# Patient Record
Sex: MALE | Race: WHITE | NOT HISPANIC OR LATINO | Employment: OTHER | URBAN - METROPOLITAN AREA
[De-identification: names, ages, dates, MRNs, and addresses within clinical notes are randomized per-mention and may not be internally consistent; named-entity substitution may affect disease eponyms.]

---

## 2017-10-03 RX ORDER — VENLAFAXINE HYDROCHLORIDE 37.5 MG/1
37.5 CAPSULE, EXTENDED RELEASE ORAL DAILY
COMMUNITY
End: 2017-10-09 | Stop reason: ALTCHOICE

## 2017-10-03 RX ORDER — LOSARTAN POTASSIUM AND HYDROCHLOROTHIAZIDE 25; 100 MG/1; MG/1
1 TABLET ORAL DAILY
COMMUNITY
End: 2020-03-10

## 2017-10-09 ENCOUNTER — TELEPHONE (OUTPATIENT)
Dept: CARDIOLOGY | Facility: CLINIC | Age: 67
End: 2017-10-09

## 2017-10-09 ENCOUNTER — OFFICE VISIT (OUTPATIENT)
Dept: CARDIOLOGY | Facility: CLINIC | Age: 67
End: 2017-10-09

## 2017-10-09 VITALS
DIASTOLIC BLOOD PRESSURE: 102 MMHG | HEART RATE: 65 BPM | HEIGHT: 69 IN | SYSTOLIC BLOOD PRESSURE: 174 MMHG | BODY MASS INDEX: 36.58 KG/M2 | WEIGHT: 247 LBS

## 2017-10-09 DIAGNOSIS — G47.33 OSA (OBSTRUCTIVE SLEEP APNEA): ICD-10-CM

## 2017-10-09 DIAGNOSIS — R60.0 LOWER EXTREMITY EDEMA: ICD-10-CM

## 2017-10-09 DIAGNOSIS — I10 ESSENTIAL HYPERTENSION: Primary | ICD-10-CM

## 2017-10-09 PROCEDURE — 99204 OFFICE O/P NEW MOD 45 MIN: CPT | Performed by: INTERNAL MEDICINE

## 2017-10-09 PROCEDURE — 93000 ELECTROCARDIOGRAM COMPLETE: CPT | Performed by: INTERNAL MEDICINE

## 2017-10-09 RX ORDER — POTASSIUM CHLORIDE 20 MEQ/1
1 TABLET, EXTENDED RELEASE ORAL 3 TIMES DAILY
COMMUNITY
Start: 2017-09-11 | End: 2020-03-10

## 2017-10-09 RX ORDER — VENLAFAXINE HYDROCHLORIDE 75 MG/1
1 CAPSULE, EXTENDED RELEASE ORAL DAILY
COMMUNITY
Start: 2017-10-06

## 2017-10-09 RX ORDER — FUROSEMIDE 20 MG/1
1 TABLET ORAL DAILY
COMMUNITY
Start: 2017-09-28 | End: 2020-03-10

## 2017-10-09 RX ORDER — CARVEDILOL 3.12 MG/1
3.12 TABLET ORAL 2 TIMES DAILY
Qty: 60 TABLET | Refills: 11 | Status: SHIPPED | OUTPATIENT
Start: 2017-10-09 | End: 2018-11-01 | Stop reason: SDUPTHER

## 2017-10-09 RX ORDER — ATORVASTATIN CALCIUM 10 MG/1
1 TABLET, FILM COATED ORAL DAILY
COMMUNITY
Start: 2017-09-06

## 2017-10-09 NOTE — PROGRESS NOTES
Mat Delvalle  1950  Date of Office Visit: 10/09/2017  Encounter Provider: Randal Solomon MD  Place of Service: [facility] CARDIOLOGY      CHIEF COMPLAINT:  Shortness of air  Lower extremity edema  Essential hypertension    HISTORY OF PRESENT ILLNESS:Dr. Blake:    I had the pleasure of seeing your patient in consultation today.  As you well know, he is a very pleasant, 67-year-old gentleman with a medical history of essential hypertension, bipolar disorder, alcohol abuse, lower extremity edema, and obstructive sleep apnea on CPAP who presents to me for evaluation of his elevated blood pressure along with lower extremity edema.    He, from a cardiac standpoint, has had a coronary angiography in 2008.          Review of Systems   Constitution: Negative for fever, weakness and malaise/fatigue.   HENT: Negative for nosebleeds and sore throat.    Eyes: Negative for blurred vision and double vision.   Cardiovascular: Negative for chest pain, claudication, palpitations and syncope.   Respiratory: Negative for cough, shortness of breath and snoring.    Endocrine: Negative for cold intolerance, heat intolerance and polydipsia.   Skin: Negative for itching, poor wound healing and rash.   Musculoskeletal: Negative for joint pain, joint swelling, muscle weakness and myalgias.   Gastrointestinal: Negative for abdominal pain, melena, nausea and vomiting.   Neurological: Negative for light-headedness, loss of balance, seizures and vertigo.   Psychiatric/Behavioral: Negative for altered mental status and depression.       Past Medical History:   Diagnosis Date   • Allergic rhinitis    • BPH (benign prostatic hyperplasia)    • Depression    • Hyperglycemia    • Hyperlipidemia    • Hypertension    • GIOVANI (obstructive sleep apnea)        The following portions of the patient's history were reviewed and updated as appropriate: Social history , Family history and Surgical history     Current  "Outpatient Prescriptions on File Prior to Visit   Medication Sig Dispense Refill   • AMLODIPINE BESYLATE PO Take 5 mg by mouth Daily.     • losartan-hydrochlorothiazide (HYZAAR) 100-25 MG per tablet Take 1 tablet by mouth Daily.     • venlafaxine XR (EFFEXOR XR) 37.5 MG 24 hr capsule Take 37.5 mg by mouth Daily.       No current facility-administered medications on file prior to visit.        Allergies   Allergen Reactions   • Ace Inhibitors Cough   • Penicillins        Vitals:    10/09/17 1245   BP: (!) 174/102   Pulse: 65   Weight: 247 lb (112 kg)   Height: 69\" (175.3 cm)     Physical Exam   Constitutional: He is oriented to person, place, and time. He appears well-developed and well-nourished.   Obese   HENT:   Head: Normocephalic and atraumatic.   Eyes: Conjunctivae and EOM are normal. No scleral icterus.   Neck: Normal range of motion. Neck supple. Normal carotid pulses, no hepatojugular reflux and no JVD present. Carotid bruit is not present. No tracheal deviation present. No thyromegaly present.   Cardiovascular: Normal rate and regular rhythm.  Exam reveals no gallop and no friction rub.    No murmur heard.  Pulses:       Carotid pulses are 2+ on the right side, and 2+ on the left side.       Radial pulses are 2+ on the right side, and 2+ on the left side.        Femoral pulses are 2+ on the right side, and 2+ on the left side.       Dorsalis pedis pulses are 2+ on the right side, and 2+ on the left side.        Posterior tibial pulses are 2+ on the right side, and 2+ on the left side.   Pulmonary/Chest: Breath sounds normal. No respiratory distress. He has no decreased breath sounds. He has no wheezes. He has no rhonchi. He has no rales. He exhibits no tenderness.   Abdominal: Soft. Bowel sounds are normal. He exhibits no distension. There is no tenderness. There is no rebound.   Musculoskeletal: He exhibits no edema or deformity.   Neurological: He is alert and oriented to person, place, and time. He has " normal strength. No sensory deficit.   Skin: No rash noted. No erythema.   Psychiatric: He has a normal mood and affect. His behavior is normal.           No components found for: CBC  No results found for: CMP  No components found for: LIPID  No results found for: BMP      ECG 12 Lead  Date/Time: 10/9/2017 1:08 PM  Performed by: RYLIE MCKEON  Authorized by: RYLIE MCKEON   Comparison: not compared with previous ECG   Previous ECG: no previous ECG available  Rhythm: sinus rhythm  Rate: normal  Conduction: conduction normal  ST Segments: ST segments normal  T Waves: T waves normal  QRS axis: normal  Clinical impression: normal ECG            DISCUSSION/SUMMARYThe patient is a 67-year-old gentleman with a medical history as documented above including essential hypertension, obesity, alcohol abuse, sleep apnea, lower extremity edema, and bipolar disorder who presents to me to establish care.  He has bilateral lower extremity edema reported in his history; however, he has none present today clinically.  He states that, after coming off the amlodipine therapy, he has noted improvement.  In addition, he has been losing weight and states that perhaps this has played a role.      His blood pressure is poorly controlled today; however, he is unable to confirm exactly what he is taking from an antihypertensive standpoint.     1. Essential hypertension.    - This is poorly controlled at this point in time.  - I think that, if he is taking Hyzaar along with atenolol, the first step would be to move his atenolol over to carvedilol for additional alpha blockade and improve blood pressure control on one combo and one additional agent.  I will await to hear from him tomorrow to let me know exactly what he is taking and titrate from there.  2. Lower extremity edema.  It appeared to be secondary to amlodipine but also possibly venous insufficiency.  - He has no evidence of heart failure and a normal proBNP  previously.  - Echocardiogram has been ordered.  3. Alcohol abuse.    - I have encouraged cessation and he will at least try to cut back a little bit.

## 2017-10-09 NOTE — TELEPHONE ENCOUNTER
Pt's wife called with his medications:  Potassium 20meq tid  Venlafaxine 75mg qd  Losartan/HCTZ 100/25mg qd  Atorvastatin 10mg qd  Furosemide 20mg qd.    I added this to his list.    Haylee

## 2018-11-01 RX ORDER — CARVEDILOL 3.12 MG/1
TABLET ORAL
Qty: 60 TABLET | Refills: 10 | Status: SHIPPED | OUTPATIENT
Start: 2018-11-01